# Patient Record
Sex: FEMALE | Race: WHITE | NOT HISPANIC OR LATINO | Employment: UNEMPLOYED | URBAN - METROPOLITAN AREA
[De-identification: names, ages, dates, MRNs, and addresses within clinical notes are randomized per-mention and may not be internally consistent; named-entity substitution may affect disease eponyms.]

---

## 2021-11-29 ENCOUNTER — OUTSIDE PLACE OF SERVICE (OUTPATIENT)
Dept: CARDIOLOGY | Facility: CLINIC | Age: 76
End: 2021-11-29
Payer: MEDICARE

## 2021-11-29 PROCEDURE — 99203 PR OFFICE/OUTPT VISIT, NEW, LEVL III, 30-44 MIN: ICD-10-PCS | Mod: ,,, | Performed by: INTERNAL MEDICINE

## 2021-11-29 PROCEDURE — 99203 OFFICE O/P NEW LOW 30 MIN: CPT | Mod: ,,, | Performed by: INTERNAL MEDICINE

## 2022-01-03 ENCOUNTER — OUTSIDE PLACE OF SERVICE (OUTPATIENT)
Dept: CARDIOLOGY | Facility: CLINIC | Age: 77
End: 2022-01-03
Payer: MEDICARE

## 2022-02-03 ENCOUNTER — OUTSIDE PLACE OF SERVICE (OUTPATIENT)
Dept: CARDIOLOGY | Facility: CLINIC | Age: 77
End: 2022-02-03
Payer: MEDICARE

## 2022-02-03 PROCEDURE — 93010 ELECTROCARDIOGRAM REPORT: CPT | Mod: ,,, | Performed by: INTERNAL MEDICINE

## 2022-02-03 PROCEDURE — 93010 PR ELECTROCARDIOGRAM REPORT: ICD-10-PCS | Mod: ,,, | Performed by: INTERNAL MEDICINE

## 2022-04-15 ENCOUNTER — HOSPITAL ENCOUNTER (EMERGENCY)
Facility: HOSPITAL | Age: 77
Discharge: HOME | End: 2022-04-15
Attending: EMERGENCY MEDICINE
Payer: MEDICARE

## 2022-04-15 VITALS
WEIGHT: 110 LBS | HEIGHT: 63 IN | OXYGEN SATURATION: 96 % | DIASTOLIC BLOOD PRESSURE: 60 MMHG | HEART RATE: 89 BPM | SYSTOLIC BLOOD PRESSURE: 125 MMHG | RESPIRATION RATE: 18 BRPM | BODY MASS INDEX: 19.49 KG/M2 | TEMPERATURE: 98 F
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
## 2022-04-15 PROCEDURE — 90471 IMMUNIZATION ADMIN: CPT | Mod: ER | Performed by: PHYSICIAN ASSISTANT

## 2022-04-15 PROCEDURE — 99900062 HC AFTER HR RABIES VACCINE ED REGISTRATION: Mod: ER

## 2022-04-15 PROCEDURE — 63600175 PHARM REV CODE 636 W HCPCS: Mod: JG,ER | Performed by: PHYSICIAN ASSISTANT

## 2022-04-15 PROCEDURE — 99999 HC NO LEVEL OF SERVICE - ED ONLY: CPT | Mod: ER

## 2022-04-15 PROCEDURE — 90675 RABIES VACCINE IM: CPT | Mod: JG,ER | Performed by: PHYSICIAN ASSISTANT

## 2022-04-15 RX ORDER — ASPIRIN 81 MG/1
81 TABLET ORAL DAILY
COMMUNITY

## 2022-04-15 RX ADMIN — RABIES VACCINE 2.5 UNITS: KIT at 08:04

## 2022-04-16 NOTE — DISCHARGE INSTRUCTIONS
Return here on Tuesday April 19th  Was 4 year next and final vaccination dose    Thank you for coming to our Emergency Department today. It is important to remember that some problems are difficult to diagnose and may not be found during your Emergency Department visit. Be sure to follow up with your primary care doctor and review all labs/imaging/tests that were performed during this visit with them. Some labs/tests may be outside of the normal range and require non-emergent follow-up and further investigation to help diagnose/exclude/prevent complications or other medical conditions.    If you do not have a primary care doctor, you may contact the one listed on your discharge paperwork or you may also call the [facility] Appointment Desk at [phone] to schedule an appointment and establish care with one. It is important to your health that you have a primary care doctor.    Please take all medications as directed. All medications may potentially have side-effects and it is impossible to predict which medications may give you side-effects or what side-effects (if any) they will give you.. If you feel that you are having a negative effect or side-effect of any medication you should immediately stop taking them and seek medical attention. If you feel that you are having a life-threatening reaction call 911.    Return to the ER with any questions/concerns, new/concerning symptoms, worsening or failure to improve.     Do not drive, swim, climb to height, take a bath or make any important decisions for 24 hours if you have received any pain medications, sedatives or mood altering drugs during your ER visit.

## 2022-04-16 NOTE — ED NOTES
Patient reports being bitten by a cat on the right hand on 04/09/22. She received tetanus, rabies injections around the wound, rabies vaccine and is on a course of amoxicillin. Patient was not able to locate a second rabies vaccine injection on day 3. She is here to follow-up for that injection. She is awake/alert and ambulatory with no distress or complaint noted.

## 2022-04-16 NOTE — ED PROVIDER NOTES
"Encounter Date: 4/15/2022       History     Chief Complaint   Patient presents with    Wound Check     Pt reports that last Saturday she got a cat bite to the right hand and was seen at [facility] and received a tetanus shot and a rabies shot and presents here for a booster rabies shot. Pt is taking amoxicillin.      77-year-old female here for her rabies vaccination.  The patient was bitten by a cat approximately 5 days ago.  She was seen at an outside facility and treated.  She was started on antibiotics.  She was given a tetanus vaccine.  She was given a rabies vaccine.  She was also given rabies immunoglobulin..   She return to that facility on day 3 to get her next vaccine dose but was referred to her PCP.  She has since seen her PCP but has been unable to get her day 3 vaccination prompting her to come here.  She is currently on day 5, she missed day 3 due to her getting the run around in the healthcare system.     The site of the animal bite does not appear to be infected.  She has been compliant with her antibiotics.  She has no fever or chills.        Review of patient's allergies indicates:   Allergen Reactions    Gold au 198 Other (See Comments)     "turns skin green"    Hydrocodone-acetaminophen Other (See Comments)     "Severe headache"      Sulfamethoxazole-trimethoprim Other (See Comments)     "Severe headache"       Past Medical History:   Diagnosis Date    Breast cancer     Diverticulitis     H/O hemorrhoidectomy     H/O total hysterectomy     History of left hip replacement     History of right hip replacement      Past Surgical History:   Procedure Laterality Date    AUGMENTATION OF BREAST Right 1990    BILATERAL TUBAL LIGATION      HEMORRHOID SURGERY      MASTECTOMY Left     TOTAL ABDOMINAL HYSTERECTOMY W/ BILATERAL SALPINGOOPHORECTOMY  07/25/1996    Fibroids     Family History   Problem Relation Age of Onset    Breast cancer Sister     Miscarriages / Stillbirths Sister     " Breast cancer Other     Miscarriages / Stillbirths Father     Miscarriages / Stillbirths Mother     Miscarriages / Stillbirths Brother     Breast cancer Paternal Aunt     Ovarian cancer Paternal Aunt      Social History     Tobacco Use    Smoking status: Never Smoker    Smokeless tobacco: Never Used   Substance Use Topics    Alcohol use: Yes    Drug use: Never     Review of Systems   Constitutional: Negative for fever.   HENT: Negative for sore throat.    Respiratory: Negative for shortness of breath.    Cardiovascular: Negative for chest pain.   Gastrointestinal: Negative for nausea.   Genitourinary: Negative for dysuria.   Musculoskeletal: Negative for back pain.   Skin: Negative for rash.        Cat bite   Neurological: Negative for weakness.   Hematological: Does not bruise/bleed easily.       Physical Exam     Initial Vitals [04/15/22 1935]   BP Pulse Resp Temp SpO2   125/60 89 18 -- 96 %      MAP       --         Physical Exam    Constitutional: Vital signs are normal. She appears well-developed and well-nourished.   HENT:   Head: Normocephalic and atraumatic.   Right Ear: Hearing normal.   Left Ear: Hearing normal.   Eyes: Conjunctivae are normal.   Cardiovascular: Normal rate and regular rhythm.   Abdominal: Abdomen is soft. Bowel sounds are normal.   Musculoskeletal:         General: Normal range of motion.     Neurological: She is alert and oriented to person, place, and time.   Skin: Skin is warm and intact.   No redness or swelling.  No signs of infection.  No abscess.   Psychiatric: She has a normal mood and affect. Her speech is normal and behavior is normal. Cognition and memory are normal.         ED Course   Procedures  Labs Reviewed - No data to display       Imaging Results    None          Medications   rabies vaccine, PCEC injection 2.5 Units (has no administration in time range)     Medical Decision Making:   Initial Assessment:   77-year-old female returning to the ER for post  exposure to rabies prophylaxis  ED Management:  Plan:  Patient will be given rabies vaccination today.  We will treat today as her 2nd dose which will be technically a day 3 dose.   She will need to return here on Tuesday April 19th for her next and final rabies vaccination dose    She should be given PCEV                       Clinical Impression:   Final diagnoses:  [W55.01XA] Cat bite, initial encounter (Primary)          ED Disposition Condition    Discharge Stable        ED Prescriptions     None        Follow-up Information    None          Johnny Ross PA-C  04/15/22 1954

## 2022-04-16 NOTE — ED NOTES
Went speak with pt and discuss POC moving forward and when she needs to return for next vaccination.   Physical Assessment    LOC: The patient is awake, alert and aware of environment with an appropriate affect, the patient is oriented x 3 and speaking appropriately.     Psych: Patient is calm and cooperative with good eye contact.    APPEARANCE: Patient is clean and non toxic appearing    NEUROLOGIC:  RIYA, Follows commands without difficulty. Speech is clear. No neuro deficits observed.    HEENT: Denies HEENT complaint or injury, moist mucus membranes     RESPIRATORY: Airway is open and patent, respirations are spontaneous; patient has a normal effort and rate. Bilateral breath sounds are clear. Pink nailbeds.     CARDIAC: Patient has a normal rate and rhythm, no peripheral edema noted, capillary refill < 2 seconds. PULSES are symmetrical in all extremities    GI/ : Soft and non tender to palpation, no distention noted.     MUSCULOSKELETAL:  Normal range of motion noted. Moves all extremities well, no c/o pain, no deformity noted.    SKIN: The skin is warm, dry and intact. Patient has normal skin turgor. Scratch to right hand with no s/s of infection. No Breakdown noted.

## 2022-04-16 NOTE — ED NOTES
Discharge instructions and follow up reviewed with pt.  Pt verbalized understanding  No s/s of reaction.  Pt discharged in stable condition.

## 2022-04-19 ENCOUNTER — HOSPITAL ENCOUNTER (EMERGENCY)
Facility: HOSPITAL | Age: 77
Discharge: HOME | End: 2022-04-19
Attending: EMERGENCY MEDICINE
Payer: MEDICARE

## 2022-04-19 VITALS
HEART RATE: 75 BPM | OXYGEN SATURATION: 97 % | RESPIRATION RATE: 16 BRPM | DIASTOLIC BLOOD PRESSURE: 72 MMHG | TEMPERATURE: 98 F | BODY MASS INDEX: 19.49 KG/M2 | WEIGHT: 110 LBS | HEIGHT: 63 IN | SYSTOLIC BLOOD PRESSURE: 158 MMHG

## 2022-04-19 DIAGNOSIS — Z23 ENCOUNTER FOR REPEAT ADMINISTRATION OF RABIES VACCINATION: Primary | ICD-10-CM

## 2022-04-19 PROCEDURE — 63600175 PHARM REV CODE 636 W HCPCS: Mod: JG,ER | Performed by: EMERGENCY MEDICINE

## 2022-04-19 PROCEDURE — 90675 RABIES VACCINE IM: CPT | Mod: JG,ER | Performed by: EMERGENCY MEDICINE

## 2022-04-19 PROCEDURE — 90471 IMMUNIZATION ADMIN: CPT | Mod: ER | Performed by: EMERGENCY MEDICINE

## 2022-04-19 PROCEDURE — 99284 EMERGENCY DEPT VISIT MOD MDM: CPT | Mod: 25,ER

## 2022-04-19 RX ADMIN — Medication 2.5 UNITS: at 10:04

## 2022-04-20 NOTE — ED PROVIDER NOTES
"Encounter Date: 4/19/2022       History     Chief Complaint   Patient presents with    Rabies Booster     Patient instructed to come here for a rabies booster shot.      77-year-old female returns to the emergency department for rabies vaccine booster.  Patient was bit couple of weeks ago.  States this is her last shot.  States her wound is healed.  Denies any complaints.        Review of patient's allergies indicates:   Allergen Reactions    Gold au 198 Other (See Comments)     "turns skin green"    Hydrocodone-acetaminophen Other (See Comments)     "Severe headache"      Sulfamethoxazole-trimethoprim Other (See Comments)     "Severe headache"       Past Medical History:   Diagnosis Date    Breast cancer     Diverticulitis     H/O hemorrhoidectomy     H/O total hysterectomy     History of left hip replacement     History of right hip replacement      Past Surgical History:   Procedure Laterality Date    AUGMENTATION OF BREAST Right 1990    BILATERAL TUBAL LIGATION      HEMORRHOID SURGERY      MASTECTOMY Left     TOTAL ABDOMINAL HYSTERECTOMY W/ BILATERAL SALPINGOOPHORECTOMY  07/25/1996    Fibroids     Family History   Problem Relation Age of Onset    Breast cancer Sister     Miscarriages / Stillbirths Sister     Breast cancer Other     Miscarriages / Stillbirths Father     Miscarriages / Stillbirths Mother     Miscarriages / Stillbirths Brother     Breast cancer Paternal Aunt     Ovarian cancer Paternal Aunt      Social History     Tobacco Use    Smoking status: Never Smoker    Smokeless tobacco: Never Used   Substance Use Topics    Alcohol use: Yes    Drug use: Never     Review of Systems   Constitutional: Negative for chills, fatigue and fever.   HENT: Negative for congestion and sore throat.    Eyes: Negative for photophobia and visual disturbance.   Respiratory: Negative for cough and shortness of breath.    Cardiovascular: Negative for chest pain and palpitations.   Gastrointestinal: " Negative for abdominal pain, diarrhea and vomiting.   Musculoskeletal: Negative for back pain, neck pain and neck stiffness.   Neurological: Negative for light-headedness, numbness and headaches.       Physical Exam     Initial Vitals [04/19/22 2149]   BP Pulse Resp Temp SpO2   -- 84 17 98.2 °F (36.8 °C) 100 %      MAP       --         Physical Exam    Nursing note and vitals reviewed.  Constitutional: She appears well-developed and well-nourished. No distress.   HENT:   Head: Normocephalic and atraumatic.   Eyes: Conjunctivae and EOM are normal. Pupils are equal, round, and reactive to light.   Neck: Neck supple. No tracheal deviation present.   Normal range of motion.  Cardiovascular: Normal rate and intact distal pulses.   Pulmonary/Chest: No respiratory distress.   Musculoskeletal:         General: No tenderness or edema. Normal range of motion.      Cervical back: Normal range of motion and neck supple.     Neurological: She is alert and oriented to person, place, and time. She has normal strength. No cranial nerve deficit. GCS score is 15. GCS eye subscore is 4. GCS verbal subscore is 5. GCS motor subscore is 6.   Skin: Skin is warm and dry.         ED Course   Procedures  Labs Reviewed - No data to display       Imaging Results    None          Medications   rabies vaccine, PCEC injection 2.5 Units (has no administration in time range)     Medical Decision Making:   Initial Assessment:   77-year-old female presents emergency department for rabies vaccine booster  ED Management:  Vaccine administered.  Patient discharged with instructions to follow up with primary care, reasons return to the emergency room.                      Clinical Impression:   Final diagnoses:  [Z23] Encounter for repeat administration of rabies vaccination (Primary)                 Jhonny Whitaker MD  04/19/22 0341

## 2025-06-18 NOTE — ED NOTES
Outreach attempt was made to schedule a Medicare Wellness Visit. This was the first attempt. Contact was made, no appointment scheduled     Pt presents for final dose of rabies booster after being bitten/scratched by a cat a few weeks ago. Pt reports wounds have healed and has no current complaints.